# Patient Record
Sex: FEMALE | Race: ASIAN | NOT HISPANIC OR LATINO | URBAN - METROPOLITAN AREA
[De-identification: names, ages, dates, MRNs, and addresses within clinical notes are randomized per-mention and may not be internally consistent; named-entity substitution may affect disease eponyms.]

---

## 2024-03-04 ENCOUNTER — OFFICE VISIT (OUTPATIENT)
Dept: GYNECOLOGY | Facility: CLINIC | Age: 34
End: 2024-03-04
Payer: MEDICAID

## 2024-03-04 VITALS
HEART RATE: 79 BPM | BODY MASS INDEX: 22.58 KG/M2 | WEIGHT: 119.63 LBS | OXYGEN SATURATION: 100 % | TEMPERATURE: 98 F | HEIGHT: 61 IN | SYSTOLIC BLOOD PRESSURE: 102 MMHG | DIASTOLIC BLOOD PRESSURE: 61 MMHG | RESPIRATION RATE: 18 BRPM

## 2024-03-04 DIAGNOSIS — Z12.4 ENCOUNTER FOR PAPANICOLAOU SMEAR FOR CERVICAL CANCER SCREENING: Primary | ICD-10-CM

## 2024-03-04 PROCEDURE — 88174 CYTOPATH C/V AUTO IN FLUID: CPT

## 2024-03-04 PROCEDURE — 3008F BODY MASS INDEX DOCD: CPT | Mod: CPTII,,,

## 2024-03-04 PROCEDURE — 87624 HPV HI-RISK TYP POOLED RSLT: CPT

## 2024-03-04 PROCEDURE — 1159F MED LIST DOCD IN RCRD: CPT | Mod: CPTII,,,

## 2024-03-04 PROCEDURE — 3074F SYST BP LT 130 MM HG: CPT | Mod: CPTII,,,

## 2024-03-04 PROCEDURE — 3078F DIAST BP <80 MM HG: CPT | Mod: CPTII,,,

## 2024-03-04 PROCEDURE — 99203 OFFICE O/P NEW LOW 30 MIN: CPT | Mod: PBBFAC

## 2024-03-04 PROCEDURE — 99385 PREV VISIT NEW AGE 18-39: CPT | Mod: S$PBB,,,

## 2024-03-04 RX ORDER — LEVOCETIRIZINE DIHYDROCHLORIDE 5 MG/1
5 TABLET, FILM COATED ORAL NIGHTLY
COMMUNITY
Start: 2023-10-03

## 2024-03-04 RX ORDER — MONTELUKAST SODIUM 10 MG/1
10 TABLET ORAL
COMMUNITY
Start: 2023-10-02

## 2024-03-04 NOTE — PROGRESS NOTES
[facility] -  Gynecology / Women's Health Clinic     Subjective:      Patient ID: Julienne Radha Ash Garg is a 33 y.o. female.    Chief Complaint:  Gynecologic Exam      History of Present Illness:  The patient  here for annual exam. Last seen by GYN 3 yrs ago. Her LMP was 24. Period last 7 days and changes pads 6x/day for hygiene purposes. Denies AUB, cycles manageable. Denies history of abnormal paps. Per outside records 23 [address] breast, d/t mass, results benign left breast cyst. Recommendations to f/u MG at age 38 yrs. Denies breast or urinary complaints. Denies abnormal bleeding or discharge. Pt had c/o pelvic pain 1/10/24 per outside records pelvic ultrasound trace fluid in the cul-de-sac, likely physiologic. Otherwise unremarkable pelvic sonographic evaluation. Denies pelvic pain today. Pt reports no STIs in the past and no concerns. Sexually active with  of 12 yrs. Contraception consist of condoms, pt ok with pregnancy. Denies tobacco use. Dep. screening 0. Denies fly hx of ovarian, uterine or colon cancer. Paternal cousin with breast cancer.     GYN & OB History:  Patient's last menstrual period was 2024 (exact date).     OB History    Para Term  AB Living   2 2 2         SAB IAB Ectopic Multiple Live Births                  # Outcome Date GA Lbr Cholo/2nd Weight Sex Delivery Anes PTL Lv   2 Term      CS-LTranv      1 Term      CS-LTranv          History reviewed. No pertinent past medical history.     Past Surgical History:   Procedure Laterality Date     SECTION       SECTION          Social History     Tobacco Use    Smoking status: Never    Smokeless tobacco: Never   Substance and Sexual Activity    Alcohol use: Not on file    Drug use: Not on file    Sexual activity: Yes        Current Outpatient Medications   Medication Instructions    aspirin-sod bicarb-citric acid (AVERY-SELTZER) 324 mg TbEF 325 mg, Oral, Every 6 hours PRN  "   levocetirizine (XYZAL) 5 mg, Oral, Nightly    montelukast (SINGULAIR) 10 mg, Oral    prenatal vit-iron fum-folic ac 65 mg iron- 1 mg Tab Oral       Review of patient's allergies indicates:  No Known Allergies      Review of Systems:  Review of Systems  Negative except for pertinent findings for positives per HPI.     Objective:     Physical Exam   Visit Vitals  /61   Pulse 79   Temp 98 °F (36.7 °C) (Oral)   Resp 18   Ht 5' 1" (1.549 m)   Wt 54.3 kg (119 lb 9.6 oz)   LMP 02/23/2024 (Exact Date)   SpO2 100%   BMI 22.60 kg/m²       GENERAL: Well-developed female. No acute distress.    SKIN: Normal to inspection, warm and intact.  BREASTS: No rashes or erythema. No masses, lumps, discharge, tenderness.  VULVA: General appearance normal; external genitalia with no lesions or erythema.  VAGINA: Mucosa/vaginal vault pink, no abnormal discharge or lesions.  CERVIX: Pink, nulliparous appearing os, no erythema or abnormal discharge.  BIMANUAL EXAM: reveals a 6 week-sized uterus. The uterus is non tender. Bilateral adnexa reveal no tenderness.  PSYCHIATRIC: Patient is oriented to person, place, and time. Mood and affect are normal.  Note: MA chaperone present for entirety of exam.    Assessment:       ICD-10-CM ICD-9-CM   1. Encounter for Papanicolaou smear for cervical cancer screening  Z12.4 V76.2       Plan:     1. Encounter for Papanicolaou smear for cervical cancer screening  -     Liquid-Based Pap Smear, Screening Screening    Pap today  Call for any GYN concerns  Follow up in about 1 year (around 3/4/2025) for Annual.    "

## 2024-03-07 LAB — PSYCHE PATHOLOGY RESULT: NORMAL

## 2025-03-17 ENCOUNTER — OFFICE VISIT (OUTPATIENT)
Dept: GYNECOLOGY | Facility: CLINIC | Age: 35
End: 2025-03-17
Payer: MEDICAID

## 2025-03-17 VITALS
DIASTOLIC BLOOD PRESSURE: 66 MMHG | HEIGHT: 61 IN | OXYGEN SATURATION: 98 % | SYSTOLIC BLOOD PRESSURE: 97 MMHG | TEMPERATURE: 98 F | HEART RATE: 98 BPM | RESPIRATION RATE: 18 BRPM | BODY MASS INDEX: 21.94 KG/M2 | WEIGHT: 116.19 LBS

## 2025-03-17 DIAGNOSIS — Z01.419 WELL WOMAN EXAM WITH ROUTINE GYNECOLOGICAL EXAM: Primary | ICD-10-CM

## 2025-03-17 PROCEDURE — 3008F BODY MASS INDEX DOCD: CPT | Mod: CPTII,,,

## 2025-03-17 PROCEDURE — 3074F SYST BP LT 130 MM HG: CPT | Mod: CPTII,,,

## 2025-03-17 PROCEDURE — 99395 PREV VISIT EST AGE 18-39: CPT | Mod: S$PBB,,,

## 2025-03-17 PROCEDURE — 1159F MED LIST DOCD IN RCRD: CPT | Mod: CPTII,,,

## 2025-03-17 PROCEDURE — 3078F DIAST BP <80 MM HG: CPT | Mod: CPTII,,,

## 2025-03-17 PROCEDURE — 99213 OFFICE O/P EST LOW 20 MIN: CPT | Mod: PBBFAC

## 2025-03-17 NOTE — PROGRESS NOTES
[facility] -  Gynecology / Women's Health Clinic     Subjective:      Patient ID: Julienne Radha Ash Garg is a 34 y.o. female.    Chief Complaint:  Gynecologic Exam      History of Present Illness:  The patient  here for annual exam. Her LMP was 25. Period last 7 days and changes pads 6x/day for hygiene purposes. Denies AUB, cycles manageable. Denies history of abnormal paps. Per outside records 23 [facility] breast, d/t mass, results benign left breast cyst. Recommendations to f/u MG at age 38 yrs. Denies breast or urinary complaints. Denies abnormal bleeding or discharge. Pt had c/o pelvic pain 1/10/24 per outside records pelvic ultrasound trace fluid in the cul-de-sac, likely physiologic. Otherwise unremarkable pelvic sonographic evaluation. Denies pelvic pain today. Pt reports no STIs in the past and no concerns. Sexually active with  of 12 yrs. Contraception consist of condoms, pt ok with pregnancy. Denies tobacco use. Dep. screening 0. Denies fly hx of ovarian, uterine or colon cancer. Paternal cousin with breast cancer.     GYN & OB History:  Patient's last menstrual period was 2025 (approximate).   Date of Last Pap: 3/7/2024    OB History    Para Term  AB Living   2 2 2      SAB IAB Ectopic Multiple Live Births             # Outcome Date GA Lbr Cholo/2nd Weight Sex Type Anes PTL Lv   2 Term      CS-LTranv      1 Term      CS-LTranv          History reviewed. No pertinent past medical history.     Past Surgical History:   Procedure Laterality Date     SECTION       SECTION          Social History     Tobacco Use    Smoking status: Never     Passive exposure: Never    Smokeless tobacco: Never   Substance and Sexual Activity    Alcohol use: Not on file    Drug use: Never    Sexual activity: Yes        Current Outpatient Medications   Medication Instructions    aspirin-sod bicarb-citric acid (AVERY-SELEDDIEER) 324 mg TbEF 325 mg, Every 6 hours  "PRN    levocetirizine (XYZAL) 5 mg, Nightly    montelukast (SINGULAIR) 10 mg    prenatal vit-iron fum-folic ac 65 mg iron- 1 mg Tab Take by mouth.       Review of patient's allergies indicates:  No Known Allergies      Review of Systems:  Review of Systems  Negative except for pertinent findings for positives per HPI.     Objective:     Physical Exam   Visit Vitals  BP 97/66   Pulse 98   Temp 98.3 °F (36.8 °C) (Oral)   Resp 18   Ht 5' 1" (1.549 m)   Wt 52.7 kg (116 lb 3.2 oz)   LMP 02/19/2025 (Approximate)   SpO2 98%   BMI 21.96 kg/m²       GENERAL: Well-developed female. No acute distress.    SKIN: Normal to inspection, warm and intact.  BREASTS: No rashes or erythema. No masses, lumps, discharge, tenderness.  VULVA: General appearance normal; external genitalia with no lesions or erythema.  VAGINA: Mucosa/vaginal vault pink, no abnormal discharge or lesions.  CERVIX: Pink, nulliparous appearing os, no erythema or abnormal discharge.  BIMANUAL EXAM: reveals a 6 week-sized uterus. The uterus is non tender. Bilateral adnexa reveal no tenderness.  PSYCHIATRIC: Patient is oriented to person, place, and time. Mood and affect are normal.  Note: FNP student present for entirety of exam.    Assessment:       ICD-10-CM ICD-9-CM   1. Well woman exam with routine gynecological exam  Z01.419 V72.31       Plan:     1. Well woman exam with routine gynecological exam    Pelvic exam today, pap UTD per ACOG recommendations    Call with any GYN concerns  Follow up in about 1 year (around 3/17/2026) for Annual.    "